# Patient Record
Sex: FEMALE | Race: WHITE | Employment: STUDENT | URBAN - METROPOLITAN AREA
[De-identification: names, ages, dates, MRNs, and addresses within clinical notes are randomized per-mention and may not be internally consistent; named-entity substitution may affect disease eponyms.]

---

## 2020-05-14 ENCOUNTER — TELEPHONE (OUTPATIENT)
Dept: ORTHOPEDIC SURGERY | Age: 25
End: 2020-05-14

## 2020-05-19 ENCOUNTER — OFFICE VISIT (OUTPATIENT)
Dept: ORTHOPEDIC SURGERY | Age: 25
End: 2020-05-19
Payer: COMMERCIAL

## 2020-05-19 VITALS — HEIGHT: 66 IN | WEIGHT: 127 LBS | BODY MASS INDEX: 20.41 KG/M2

## 2020-05-19 PROCEDURE — G8427 DOCREV CUR MEDS BY ELIG CLIN: HCPCS | Performed by: ORTHOPAEDIC SURGERY

## 2020-05-19 PROCEDURE — 1036F TOBACCO NON-USER: CPT | Performed by: ORTHOPAEDIC SURGERY

## 2020-05-19 PROCEDURE — G8420 CALC BMI NORM PARAMETERS: HCPCS | Performed by: ORTHOPAEDIC SURGERY

## 2020-05-19 PROCEDURE — 99204 OFFICE O/P NEW MOD 45 MIN: CPT | Performed by: ORTHOPAEDIC SURGERY

## 2020-05-19 RX ORDER — CLINDAMYCIN AND BENZOYL PEROXIDE 10; 50 MG/G; MG/G
GEL TOPICAL 2 TIMES DAILY
COMMUNITY
Start: 2017-07-10 | End: 2020-11-17 | Stop reason: ALTCHOICE

## 2020-05-19 RX ORDER — ISOTRETINOIN 30 MG/1
30 CAPSULE ORAL 2 TIMES DAILY
COMMUNITY
Start: 2017-10-16 | End: 2020-11-17 | Stop reason: ALTCHOICE

## 2020-05-19 RX ORDER — NAPROXEN SODIUM 220 MG
440 TABLET ORAL ONCE
COMMUNITY
End: 2020-11-17 | Stop reason: ALTCHOICE

## 2020-05-19 RX ORDER — NORETHINDRONE ACETATE AND ETHINYL ESTRADIOL 1MG-20(21)
1 KIT ORAL DAILY
COMMUNITY
Start: 2020-01-21

## 2020-05-19 RX ORDER — ISOTRETINOIN 40 MG/1
40 CAPSULE ORAL DAILY
COMMUNITY
Start: 2017-08-14 | End: 2020-11-17 | Stop reason: ALTCHOICE

## 2020-05-19 SDOH — HEALTH STABILITY: MENTAL HEALTH: HOW OFTEN DO YOU HAVE A DRINK CONTAINING ALCOHOL?: NEVER

## 2020-10-29 ENCOUNTER — OFFICE VISIT (OUTPATIENT)
Dept: CARDIOLOGY CLINIC | Age: 25
End: 2020-10-29
Payer: COMMERCIAL

## 2020-10-29 VITALS
BODY MASS INDEX: 21.95 KG/M2 | DIASTOLIC BLOOD PRESSURE: 70 MMHG | SYSTOLIC BLOOD PRESSURE: 130 MMHG | WEIGHT: 136 LBS | TEMPERATURE: 98.6 F

## 2020-10-29 PROCEDURE — G8427 DOCREV CUR MEDS BY ELIG CLIN: HCPCS | Performed by: INTERNAL MEDICINE

## 2020-10-29 PROCEDURE — 0296T PR EXT ECG > 48HR TO 21 DAY RCRD W/CONECT INTL RCRD: CPT | Performed by: INTERNAL MEDICINE

## 2020-10-29 PROCEDURE — 99204 OFFICE O/P NEW MOD 45 MIN: CPT | Performed by: INTERNAL MEDICINE

## 2020-10-29 PROCEDURE — 93000 ELECTROCARDIOGRAM COMPLETE: CPT | Performed by: INTERNAL MEDICINE

## 2020-10-29 PROCEDURE — G8484 FLU IMMUNIZE NO ADMIN: HCPCS | Performed by: INTERNAL MEDICINE

## 2020-10-29 PROCEDURE — G8420 CALC BMI NORM PARAMETERS: HCPCS | Performed by: INTERNAL MEDICINE

## 2020-10-29 PROCEDURE — 1036F TOBACCO NON-USER: CPT | Performed by: INTERNAL MEDICINE

## 2020-10-29 ASSESSMENT — ENCOUNTER SYMPTOMS
CHEST TIGHTNESS: 0
CHOKING: 0
COUGH: 0
SHORTNESS OF BREATH: 0

## 2020-10-29 NOTE — PROGRESS NOTES
History Narrative    Not on file     FH reviewed. Fa with HTN/lipid. GM cardiomyopathy, GF with stent carotid,  CHF,  Mo family with HTN. MVP in Aunts. GF on FA, MI first 28. GM MVP. Vitals:    10/29/20 0915   BP: 130/70   Temp: 98.6 °F (37 °C)     Wt 136    Review of Systems   Constitutional: Negative for activity change, appetite change and fatigue. Respiratory: Negative for cough, choking, chest tightness and shortness of breath. Cardiovascular: Negative for chest pain, palpitations and leg swelling. Denies PND or orthopnea. No tachycardia or syncope. Neurological: Negative for dizziness, syncope and light-headedness. Psychiatric/Behavioral: Negative for agitation, behavioral problems and confusion. All other systems reviewed and are negative. Objective:   Physical Exam  Constitutional:       General: She is not in acute distress. Appearance: Normal appearance. She is well-developed. HENT:      Head: Normocephalic and atraumatic. Right Ear: External ear normal.      Left Ear: External ear normal.   Neck:      Musculoskeletal: Normal range of motion. Vascular: No JVD. Cardiovascular:      Rate and Rhythm: Normal rate and regular rhythm. Heart sounds: Normal heart sounds. No murmur. No gallop. Pulmonary:      Effort: Pulmonary effort is normal. No respiratory distress. Breath sounds: Normal breath sounds. No wheezing or rales. Abdominal:      General: Bowel sounds are normal.      Palpations: Abdomen is soft. Tenderness: There is no abdominal tenderness. Musculoskeletal: Normal range of motion. Skin:     General: Skin is warm and dry. Neurological:      General: No focal deficit present. Mental Status: She is oriented to person, place, and time. Psychiatric:         Mood and Affect: Mood normal.         Behavior: Behavior normal.         Assessment:       Diagnosis Orders   1. Paroxysmal atrial fibrillation (HCC)     2. Palpitation     3. Chest pain, unspecified type     4. Light headedness             Plan:      Palp/tachy. Watch says afib EKG today shows NSR,WNL. Will have echo. Plain GXT.  (possibly induce afib.)  2 wk zio.   CBC, thyroid function, EP1.  F/U after        Zoie Rod MD

## 2020-11-04 ENCOUNTER — HOSPITAL ENCOUNTER (OUTPATIENT)
Dept: NON INVASIVE DIAGNOSTICS | Age: 25
Discharge: HOME | End: 2020-11-04
Payer: COMMERCIAL

## 2020-11-04 LAB
LV EF: 58 %
LVEF MODALITY: NORMAL

## 2020-11-04 PROCEDURE — 93306 TTE W/DOPPLER COMPLETE: CPT

## 2020-11-04 PROCEDURE — 93017 CV STRESS TEST TRACING ONLY: CPT

## 2020-11-17 ENCOUNTER — OFFICE VISIT (OUTPATIENT)
Dept: CARDIOLOGY CLINIC | Age: 25
End: 2020-11-17
Payer: COMMERCIAL

## 2020-11-17 VITALS
HEART RATE: 86 BPM | WEIGHT: 131 LBS | BODY MASS INDEX: 21.05 KG/M2 | TEMPERATURE: 97.9 F | HEIGHT: 66 IN | DIASTOLIC BLOOD PRESSURE: 82 MMHG | SYSTOLIC BLOOD PRESSURE: 128 MMHG | OXYGEN SATURATION: 96 %

## 2020-11-17 DIAGNOSIS — R07.9 CHEST PAIN, UNSPECIFIED TYPE: ICD-10-CM

## 2020-11-17 DIAGNOSIS — R00.2 PALPITATION: ICD-10-CM

## 2020-11-17 DIAGNOSIS — I48.0 PAROXYSMAL ATRIAL FIBRILLATION (HCC): ICD-10-CM

## 2020-11-17 DIAGNOSIS — R42 LIGHT HEADEDNESS: ICD-10-CM

## 2020-11-17 LAB
ANION GAP SERPL CALCULATED.3IONS-SCNC: 13 MMOL/L (ref 3–16)
BUN BLDV-MCNC: 7 MG/DL (ref 7–20)
CALCIUM SERPL-MCNC: 9.5 MG/DL (ref 8.3–10.6)
CHLORIDE BLD-SCNC: 103 MMOL/L (ref 99–110)
CO2: 23 MMOL/L (ref 21–32)
CREAT SERPL-MCNC: 0.6 MG/DL (ref 0.6–1.1)
GFR AFRICAN AMERICAN: >60
GFR NON-AFRICAN AMERICAN: >60
GLUCOSE BLD-MCNC: 96 MG/DL (ref 70–99)
HCT VFR BLD CALC: 40.4 % (ref 36–48)
HEMOGLOBIN: 13.8 G/DL (ref 12–16)
MCH RBC QN AUTO: 30 PG (ref 26–34)
MCHC RBC AUTO-ENTMCNC: 34.1 G/DL (ref 31–36)
MCV RBC AUTO: 87.9 FL (ref 80–100)
PDW BLD-RTO: 12.8 % (ref 12.4–15.4)
PLATELET # BLD: 355 K/UL (ref 135–450)
PMV BLD AUTO: 9.3 FL (ref 5–10.5)
POTASSIUM SERPL-SCNC: 4.3 MMOL/L (ref 3.5–5.1)
RBC # BLD: 4.6 M/UL (ref 4–5.2)
SODIUM BLD-SCNC: 139 MMOL/L (ref 136–145)
T3 FREE: 3.8 PG/ML (ref 2.3–4.2)
T4 TOTAL: 9.9 UG/DL (ref 4.5–10.9)
WBC # BLD: 9.1 K/UL (ref 4–11)

## 2020-11-17 PROCEDURE — 99214 OFFICE O/P EST MOD 30 MIN: CPT | Performed by: INTERNAL MEDICINE

## 2020-11-17 PROCEDURE — G8427 DOCREV CUR MEDS BY ELIG CLIN: HCPCS | Performed by: INTERNAL MEDICINE

## 2020-11-17 PROCEDURE — G8484 FLU IMMUNIZE NO ADMIN: HCPCS | Performed by: INTERNAL MEDICINE

## 2020-11-17 PROCEDURE — 1036F TOBACCO NON-USER: CPT | Performed by: INTERNAL MEDICINE

## 2020-11-17 PROCEDURE — G8420 CALC BMI NORM PARAMETERS: HCPCS | Performed by: INTERNAL MEDICINE

## 2020-11-17 ASSESSMENT — ENCOUNTER SYMPTOMS
COUGH: 0
CHOKING: 0
SHORTNESS OF BREATH: 0
CHEST TIGHTNESS: 0

## 2020-11-17 NOTE — PROGRESS NOTES
Subjective:      Patient ID: Gabriella Ibarra is a 23 y.o. female. HPI  Self referred for palp/poss afib. Episodes of tachycardia on runs primarily. Palpitation. Some vague chest and back pain. Runs 4x per week. Longest 30 minutes. Now watching cafeine. History reviewed. No pertinent past medical history. Past Surgical History:   Procedure Laterality Date    WISDOM TOOTH EXTRACTION         Social History     Socioeconomic History    Marital status: Single     Spouse name: Not on file    Number of children: Not on file    Years of education: Not on file    Highest education level: Not on file   Occupational History    Not on file   Social Needs    Financial resource strain: Not on file    Food insecurity     Worry: Not on file     Inability: Not on file    Transportation needs     Medical: Not on file     Non-medical: Not on file   Tobacco Use    Smoking status: Never Smoker    Smokeless tobacco: Never Used   Substance and Sexual Activity    Alcohol use: Yes     Alcohol/week: 3.0 standard drinks     Types: 3 Glasses of wine per week     Frequency: Never     Comment: every 2 weekends    Drug use: Never    Sexual activity: Not on file   Lifestyle    Physical activity     Days per week: Not on file     Minutes per session: Not on file    Stress: Not on file   Relationships    Social connections     Talks on phone: Not on file     Gets together: Not on file     Attends Yarsani service: Not on file     Active member of club or organization: Not on file     Attends meetings of clubs or organizations: Not on file     Relationship status: Not on file    Intimate partner violence     Fear of current or ex partner: Not on file     Emotionally abused: Not on file     Physically abused: Not on file     Forced sexual activity: Not on file   Other Topics Concern    Not on file   Social History Narrative    Not on file     FH reviewed. Fa with HTN/lipid.   GM cardiomyopathy, GF with stent carotid,  CHF,  Mo family with HTN. MVP in Aunts. GF on FA, MI first 28. GM MVP. Vitals:    20 0927   BP: 128/82   Pulse: 86   Temp: 97.9 °F (36.6 °C)   SpO2: 96%     Wt 136    Review of Systems   Constitutional: Negative for activity change, appetite change and fatigue. Respiratory: Negative for cough, choking, chest tightness and shortness of breath. Cardiovascular: Negative for chest pain, palpitations and leg swelling. Denies PND or orthopnea. No tachycardia or syncope. Neurological: Negative for dizziness, syncope and light-headedness. Psychiatric/Behavioral: Negative for agitation, behavioral problems and confusion. All other systems reviewed and are negative. Objective:   Physical Exam  Constitutional:       General: She is not in acute distress. Appearance: Normal appearance. She is well-developed. HENT:      Head: Normocephalic and atraumatic. Right Ear: External ear normal.      Left Ear: External ear normal.   Neck:      Musculoskeletal: Normal range of motion. Vascular: No JVD. Cardiovascular:      Rate and Rhythm: Normal rate and regular rhythm. Heart sounds: Normal heart sounds. No murmur. No gallop. Pulmonary:      Effort: Pulmonary effort is normal. No respiratory distress. Breath sounds: Normal breath sounds. No wheezing or rales. Abdominal:      General: Bowel sounds are normal.      Palpations: Abdomen is soft. Tenderness: There is no abdominal tenderness. Musculoskeletal: Normal range of motion. Skin:     General: Skin is warm and dry. Neurological:      General: No focal deficit present. Mental Status: She is oriented to person, place, and time. Psychiatric:         Mood and Affect: Mood normal.         Behavior: Behavior normal.         Assessment:       Diagnosis Orders   1. Paroxysmal atrial fibrillation (HCC)     2. Palpitation     3. Lightheadedness     4.  Chest pain, unspecified type Plan:      Palp/tach continues with runs. Reviewed previous records and testing including echo/stress. Normal.  Zio pending. No arrhytmia noted on stress. Will have labs which she did not get. Will review Zio when available. If has afib will have EP see for options.          Tosha Bond MD

## 2020-11-23 ENCOUNTER — TELEPHONE (OUTPATIENT)
Dept: CARDIOLOGY CLINIC | Age: 25
End: 2020-11-23

## 2020-11-23 NOTE — TELEPHONE ENCOUNTER
Pt informed her Zio patch has some abnormalities which was expected and she will need to schedule an appointment with E.P.  I will coordinate with EAniyaP and call pt with an appointment

## 2020-11-23 NOTE — TELEPHONE ENCOUNTER
[employer] called to report an urgent Zio Monitor. The patient wore the monitor from 10/29/20 to 11/12/20. The patient had 3 episodes of high grade AV block lasting 29 seconds and 3.5 second pauses. Please call the patient back at [phone] to advise.

## 2020-11-24 ENCOUNTER — TELEPHONE (OUTPATIENT)
Dept: CARDIOLOGY CLINIC | Age: 25
End: 2020-11-24

## 2020-11-24 NOTE — TELEPHONE ENCOUNTER
Patient had a monitor and Dr. April Taveras would like her to     See Dr. Alvaro Lyman on Dec. 1, 2020 @ 11:00am  In [facility]     ok per [facility]    (this will be an overbook)    ANTONINO left a message for her to call back and verify this is     Ok.

## 2020-11-30 NOTE — PROGRESS NOTES
[address]   Cardiac Electrophysiology Consultation   Date: 12/1/2020  Reason for Consultation:  AVB  Consult Requesting Physician: No att. providers found     Chief Complaint:   Chief Complaint   Patient presents with    New Patient      HPI: Crystal Barrera is a 23 y.o. female referred by Dr. Xin Chen for abnormal monitor. Pt with no significant cardiac or medical history. Pt was originally seen by Dr. Xin Chen for palpitations and episodes of tachycardia. About a year ago, pt's watch showed possible AF. She also has had recent complaints of CP, back pain, and lightheadedness. Treadmill stress test and echo were normal.  2 week monitor showed episodes of paroxysmal AVB. No AF noted. She reported symptoms correlating with ST. Interval History: Today, she is being seen as a new pt to review results of the monitor. In 10/2020, she was running uphill when she became lightheaded and felt palpitations to the point as feeling as if she was going to pass out. Her Apple 4 watch recorded possible AF. She has had other runs since where she has become lightheaded and felt palpitations, but she was able to continue running. She denies any viral infection. She drinks 3-4 bottles of 800 ml water a day. She has recently cut her caffeine intake, but has not noticed a difference in how she feels. However, she also hasn't been running since making the change in caffeine. She does not add salt to her diet and she reports craving salty snacks. Denies drinking soda and confirms she drinks ETOH socially. 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 No past medical history on file. Past Surgical History:   Procedure Laterality Date    WISDOM TOOTH EXTRACTION         Allergies:  No Known Allergies    Medication:   Prior to Admission medications    Medication Sig Start Date End Date Taking?  Authorizing Provider   norethindrone-ethinyl estradiol (JUNEL FE 1/20) 1-20 MG-MCG per tablet Take 1 tablet by mouth daily 1/21/20  Yes Historical Provider, MD       Social History:   reports that she has never smoked. She has never used smokeless tobacco. She reports current alcohol use of about 3.0 standard drinks of alcohol per week. She reports that she does not use drugs. Family History:  family history includes High Blood Pressure in her father. Reviewed. Denies family history of sudden cardiac death, arrhythmia, premature CAD    Review of System:    · General ROS: negative for - chills, fever   · Psychological ROS: negative for - anxiety or depression  · Ophthalmic ROS: negative for - eye pain or loss of vision  · ENT ROS: negative for - epistaxis, headaches, nasal discharge, sore throat   · Allergy and Immunology ROS: negative for - hives, nasal congestion   · Hematological and Lymphatic ROS: negative for - bleeding problems, blood clots, bruising or jaundice  · Endocrine ROS: negative for - skin changes, temperature intolerance or unexpected weight changes  · Respiratory ROS: negative for - cough, hemoptysis, pleuritic pain, SOB, sputum changes or wheezing  · Cardiovascular ROS: Per HPI. · Gastrointestinal ROS: negative for - abdominal pain, blood in stools, diarrhea, hematemesis, melena, nausea/vomiting or swallowing difficulty/pain  · Genito-Urinary ROS: negative for - dysuria or incontinence  · Musculoskeletal ROS: negative for - joint swelling or muscle pain  · Neurological ROS: negative for - confusion, dizziness, gait disturbance, headaches, numbness/tingling, seizures, speech problems, tremors, visual changes or weakness  · Dermatological ROS: negative for - rash    Physical Examination:  Vitals:    12/01/20 1122   BP: 120/70   Pulse: 87   Temp: 98.4 °F (36.9 °C)       · Constitutional: Oriented. No distress. · Head: Normocephalic and atraumatic. · Mouth/Throat: Oropharynx is clear and moist.   · Eyes: Conjunctivae normal. EOM are normal.   · Neck: Normal range of motion. Neck supple. No rigidity.   No JVD present. · Cardiovascular: Normal rate, regular rhythm, S1&S2 and intact distal pulses. · Pulmonary/Chest: Bilateral respiratory sounds. No wheezes. No rhonchi. · Abdominal: Soft. Bowel sounds present. No distension, No tenderness. · Musculoskeletal: No tenderness. No edema    · Lymphadenopathy: Has no cervical adenopathy. · Neurological: Alert and oriented. Cranial nerve appears intact, No Gross deficit   · Skin: Skin is warm and dry. No rash noted. · Psychiatric: Has a normal mood, affect and behavior     Labs:  Reviewed. ECG: reviewed, Sinus  Rhythm, with QRS duration 84 ms. No pathologic Q waves, ventricular pre-excitation, or QT prolongation. Studies:   1. 14 day Zio (10/29-11/12/20):  SR with average HR 88 () with episodes of paroxysmal AVB with rates as low as 24 bpm.  Symptoms correlating with ST    2. Echo 11/4/20:   Summary   Normal left ventricular size and wall thickness. Normal left ventricular systolic function with an estimate ejection fraction   of 55-60%. There are no regional wall motion abnormalities. Normal diastolic function. Mild mitral regurgitation. Trace pulmonic and tricuspid regurgitation. Estimated pulmonary artery systolic pressure is 19 mmHg assuming a right   atrial pressure of 3 mmHg. No prior echo for comparison    3. Stress Test 11/4/20:    Summary   Using the Andrew protocol, the patient was stressed for 09:00 minutes. The   target heart rate was 177 and the patient achieved a heart rate of 190   (Stage 4). Exercise was terminated because of achievement of target heart   rate and fatigue. Impression:   Negative, good exercise capacity, achieved 10 METS. 4. Cath: The MCOT, echocardiogram, stress test, and coronary angiography/PCI were reviewed by myself and used for my plan of care. Procedures:  1. None    Assessment/Plan:   1. Paroxysmal AVB  2. Inappropriate sinus tachycardia  3.   Neurocardiogenic symptoms    In terms of the paroxysmal AV block, this happens in the setting of very rapid atrial firing. Interestingly, P wave morphology is about the same. Hence, I discussed about the possibilities of concealed penetration into the AV node leading on to delay in the ventricular escape. I discussed in detail about the treatment options including beta-blockers/calcium blockers/sodium channel blockers, to minimize abutment of the AV node by sinus signals. As the patient is not symptomatic during this episode of paroxysmal AV block, it is reasonable not to consider any medications as of now. She will focus more on the lifestyle modifications. Complains of lightheadedness, palpitations, and near syncope, particularly with exercise  Normal echo and stress test  Monitor showed episodes of paroxysmal AVB, at about 10 PM, with no symptoms    Her symptoms noted to monitor correlated with sinus tachycardia   Discussed with the lifestyle modifications in detail. Cut down coffee, soda and other caffeinated drinks; drink plenty of water and don't restrict salt intake. ¨ Salt/water loading  ¨ The goal is up to 3 liters of water/day and 7000-10,000 mg of sodium/day. This might not always be possible in persons with heart disease (for example), so this needs to be monitored. ¨ 1 tsp of ordinary table salt= about 2300 mg of sodium, and the ordinary diet contains about 4000 mg of sodium. So as a rough estimate, you might be aiming for 1.5 to 2 more tsp of salt/day. Start slow and work up. ¨ There are online tools (such as WebMD.com) that can help you track your sodium intake. ¨ Stay upright during the day. Do not lie down all day long because you feel better. Get up and walk around a little bit every hour. Also discussed about the counter pressure maneuvers. Stand and place only the upper back against a wall (with ankles approximately 15 cm away from the wall) without moving.  The sessions are initially performed in a quiet and

## 2020-12-01 ENCOUNTER — OFFICE VISIT (OUTPATIENT)
Dept: CARDIOLOGY CLINIC | Age: 25
End: 2020-12-01
Payer: COMMERCIAL

## 2020-12-01 VITALS
WEIGHT: 134 LBS | BODY MASS INDEX: 21.63 KG/M2 | TEMPERATURE: 98.4 F | HEART RATE: 87 BPM | SYSTOLIC BLOOD PRESSURE: 120 MMHG | DIASTOLIC BLOOD PRESSURE: 70 MMHG

## 2020-12-01 PROCEDURE — G8427 DOCREV CUR MEDS BY ELIG CLIN: HCPCS | Performed by: INTERNAL MEDICINE

## 2020-12-01 PROCEDURE — 99214 OFFICE O/P EST MOD 30 MIN: CPT | Performed by: INTERNAL MEDICINE

## 2020-12-01 PROCEDURE — 93000 ELECTROCARDIOGRAM COMPLETE: CPT | Performed by: INTERNAL MEDICINE

## 2020-12-01 PROCEDURE — 1036F TOBACCO NON-USER: CPT | Performed by: INTERNAL MEDICINE

## 2020-12-01 PROCEDURE — G8420 CALC BMI NORM PARAMETERS: HCPCS | Performed by: INTERNAL MEDICINE

## 2020-12-01 PROCEDURE — G8484 FLU IMMUNIZE NO ADMIN: HCPCS | Performed by: INTERNAL MEDICINE

## 2020-12-02 PROCEDURE — 0298T PR EXT ECG > 48HR TO 21 DAY REVIEW AND INTERPRETATN: CPT | Performed by: INTERNAL MEDICINE

## 2021-05-17 ENCOUNTER — TELEPHONE (OUTPATIENT)
Dept: CARDIOLOGY CLINIC | Age: 26
End: 2021-05-17

## 2021-05-17 NOTE — TELEPHONE ENCOUNTER
Was seen in the ER on Saturday, Dr. Rohan Borges would like for her to come in sooner than 06/01/2021.  Please call [phone]

## 2021-05-19 NOTE — PROGRESS NOTES
[facility]   Cardiac Electrophysiology Consultation   Date: 5/25/2021  Reason for Consultation:  AVB  Consult Requesting Physician: No att. providers found     Chief Complaint:   Chief Complaint   Patient presents with    Follow-Up from Hospital     ED      HPI: Fany Beltran is a 23 y.o. female referred by Dr. Hailey Acuna for abnormal monitor. Pt with no significant cardiac or medical history. Pt was originally seen by Dr. Hailey Acuna for palpitations and episodes of tachycardia. About a year ago, pt's watch showed possible AF. She had c/o CP, palpitations, lightheadedness, and near syncope. Treadmill stress test and echo were normal.  2 week monitor showed episodes of paroxysmal AVB. No AF noted. She reported symptoms correlating with ST. She was instructed about lifestyle modifications for symptom management. Interval History: Today, she is here for follow up after recent ED visit, presenting in [address] [place] [address]. On 5/15/21, she presented to [facility] ED for a 5 hr episode of palpitations following a run, her watch reporting AF. ECG reported as SR, rate 100 and she had a negative work up. During the ECG, she did not notice any of the palpitations, but she did feel her HR was higher. 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 She exercises by running (not as much as in the past) and yoga. No past medical history on file. Past Surgical History:   Procedure Laterality Date    WISDOM TOOTH EXTRACTION         Allergies:  No Known Allergies    Medication:   Prior to Admission medications    Medication Sig Start Date End Date Taking? Authorizing Provider   norethindrone-ethinyl estradiol (JUNEL FE 1/20) 1-20 MG-MCG per tablet Take 1 tablet by mouth daily 1/21/20  Yes Historical Provider, MD       Social History:   reports that she has never smoked.  She has never used smokeless tobacco. She reports current alcohol use of about 3.0 standard drinks of alcohol per week. She reports that she does not use drugs. Family History:  family history includes High Blood Pressure in her father. Reviewed. Denies family history of sudden cardiac death, arrhythmia, premature CAD    Review of System:    · General ROS: negative for - chills, fever   · Psychological ROS: negative for - anxiety or depression  · Ophthalmic ROS: negative for - eye pain or loss of vision  · ENT ROS: negative for - epistaxis, headaches, nasal discharge, sore throat   · Allergy and Immunology ROS: negative for - hives, nasal congestion   · Hematological and Lymphatic ROS: negative for - bleeding problems, blood clots, bruising or jaundice  · Endocrine ROS: negative for - skin changes, temperature intolerance or unexpected weight changes  · Respiratory ROS: negative for - cough, hemoptysis, pleuritic pain, SOB, sputum changes or wheezing  · Cardiovascular ROS: Per HPI. · Gastrointestinal ROS: negative for - abdominal pain, blood in stools, diarrhea, hematemesis, melena, nausea/vomiting or swallowing difficulty/pain  · Genito-Urinary ROS: negative for - dysuria or incontinence  · Musculoskeletal ROS: negative for - joint swelling or muscle pain  · Neurological ROS: negative for - confusion, dizziness, gait disturbance, headaches, numbness/tingling, seizures, speech problems, tremors, visual changes or weakness  · Dermatological ROS: negative for - rash    Physical Examination:  Vitals:    05/25/21 0931   BP: 104/62   Pulse: 84       · Constitutional: Oriented. No distress. · Head: Normocephalic and atraumatic. · Mouth/Throat: Oropharynx is clear and moist.   · Eyes: Conjunctivae normal. EOM are normal.   · Neck: Normal range of motion. Neck supple. No rigidity. No JVD present. · Cardiovascular: Normal rate, regular rhythm, S1&S2 and intact distal pulses. · Pulmonary/Chest: Bilateral respiratory sounds. No wheezes. No rhonchi. · Abdominal: Soft. Bowel sounds present.  No Nick Elizabeth RN, am scribing for and in the presence of Dr. Erika Booker. 05/25/21 9:45 AM  MARYBETH Aguirre MD, personally performed the services prescribed in this documentation as scribed by Ms. Nick Elizabeth RN in my presence and it is both accurate and complete.        Lieutenant Deejay COOK  Cardiac Electrophysiology  [address]

## 2021-05-25 ENCOUNTER — OFFICE VISIT (OUTPATIENT)
Dept: CARDIOLOGY CLINIC | Age: 26
End: 2021-05-25
Payer: COMMERCIAL

## 2021-05-25 VITALS
BODY MASS INDEX: 21.31 KG/M2 | DIASTOLIC BLOOD PRESSURE: 62 MMHG | HEIGHT: 66 IN | WEIGHT: 132.6 LBS | HEART RATE: 84 BPM | SYSTOLIC BLOOD PRESSURE: 104 MMHG

## 2021-05-25 DIAGNOSIS — R00.2 PALPITATION: ICD-10-CM

## 2021-05-25 DIAGNOSIS — R42 LIGHT HEADEDNESS: ICD-10-CM

## 2021-05-25 DIAGNOSIS — I44.30 AV BLOCK: Primary | ICD-10-CM

## 2021-05-25 DIAGNOSIS — R00.0 INAPPROPRIATE SINUS TACHYCARDIA: ICD-10-CM

## 2021-05-25 PROCEDURE — G8427 DOCREV CUR MEDS BY ELIG CLIN: HCPCS | Performed by: INTERNAL MEDICINE

## 2021-05-25 PROCEDURE — 99213 OFFICE O/P EST LOW 20 MIN: CPT | Performed by: INTERNAL MEDICINE

## 2021-05-25 PROCEDURE — 93000 ELECTROCARDIOGRAM COMPLETE: CPT | Performed by: INTERNAL MEDICINE

## 2021-05-25 PROCEDURE — G8420 CALC BMI NORM PARAMETERS: HCPCS | Performed by: INTERNAL MEDICINE

## 2021-05-25 PROCEDURE — 1036F TOBACCO NON-USER: CPT | Performed by: INTERNAL MEDICINE
